# Patient Record
Sex: MALE | Race: WHITE
[De-identification: names, ages, dates, MRNs, and addresses within clinical notes are randomized per-mention and may not be internally consistent; named-entity substitution may affect disease eponyms.]

---

## 2019-03-06 ENCOUNTER — HOSPITAL ENCOUNTER (OUTPATIENT)
Dept: HOSPITAL 47 - RADXRMAIN | Age: 34
End: 2019-03-06
Payer: COMMERCIAL

## 2019-03-06 DIAGNOSIS — M23.41: Primary | ICD-10-CM

## 2019-03-06 DIAGNOSIS — M25.861: ICD-10-CM

## 2019-03-06 NOTE — XR
EXAMINATION TYPE: XR knee complete RT

 

DATE OF EXAM: 3/6/2019

 

CLINICAL HISTORY: ACL repair 2008 with chronic pain.

 

TECHNIQUE:  Three views of the right knee are obtained.

 

COMPARISON: None.

 

FINDINGS:  There is 1.2 x 0.6 cm well-defined osseous fragment at level of tibial condyles consistent
 with intra-articular loose body may be accounting for patient's symptoms. Mild tricompartment joint 
space loss is present. Overlying soft tissue is unremarkable.

 

IMPRESSION: As above.

## 2021-05-04 ENCOUNTER — HOSPITAL ENCOUNTER (EMERGENCY)
Dept: HOSPITAL 47 - EC | Age: 36
Discharge: HOME | End: 2021-05-04
Payer: COMMERCIAL

## 2021-05-04 VITALS
DIASTOLIC BLOOD PRESSURE: 87 MMHG | HEART RATE: 88 BPM | SYSTOLIC BLOOD PRESSURE: 143 MMHG | TEMPERATURE: 98.3 F | RESPIRATION RATE: 20 BRPM
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
## 2021-05-04 DIAGNOSIS — S51.812A: Primary | ICD-10-CM

## 2021-05-04 PROCEDURE — 96372 THER/PROPH/DIAG INJ SC/IM: CPT

## 2021-05-04 PROCEDURE — 99282 EMERGENCY DEPT VISIT SF MDM: CPT

## 2021-05-04 PROCEDURE — 90715 TDAP VACCINE 7 YRS/> IM: CPT

## 2021-05-04 PROCEDURE — 12002 RPR S/N/AX/GEN/TRNK2.6-7.5CM: CPT

## 2021-05-04 PROCEDURE — 90471 IMMUNIZATION ADMIN: CPT

## 2021-05-04 NOTE — ED
General Adult HPI





- General


Chief complaint: Wound/Laceration


Stated complaint: Lt Arm Laceration


Time Seen by Provider: 05/04/21 15:01


Source: patient, RN notes reviewed


Mode of arrival: ambulatory


Limitations: no limitations





- History of Present Illness


Initial comments: 





35-year-old male presents to the emergency room for laceration.  Patient reports

he was using a  to cut open a box.  He slipped and cut his arm.  

Denies any weakness or difficulty moving the fingers or the hand.  Denies any 

other injuries.  Tetanus is not up-to-date.Patient has no other complaints at 

this time including shortness of breath, chest pain, abdominal pain, nausea or 

vomiting, headache, or visual changes.





- Related Data


                                    Allergies











Allergy/AdvReac Type Severity Reaction Status Date / Time


 


terbinafine [From Lamisil] Allergy  Rash/Hives Verified 05/04/21 14:49














Review of Systems


ROS Statement: 


Those systems with pertinent positive or pertinent negative responses have been 

documented in the HPI.





ROS Other: All systems not noted in ROS Statement are negative.





Past Medical History


Past Medical History: No Reported History


History of Any Multi-Drug Resistant Organisms: None Reported


Past Surgical History: Orthopedic Surgery


Additional Past Surgical History / Comment(s): rt knee,rt leg


Past Psychological History: No Psychological Hx Reported


Smoking Status: Never smoker


Past Alcohol Use History: Occasional


Past Drug Use History: None Reported





General Exam


Limitations: no limitations


General appearance: alert, in no apparent distress


Head exam: Present: atraumatic, normocephalic, normal inspection


Eye exam: Present: normal appearance, PERRL, EOMI.  Absent: scleral icterus, 

conjunctival injection, periorbital swelling


ENT exam: Present: normal exam, mucous membranes moist


Neck exam: Present: normal inspection.  Absent: tenderness, meningismus, lymphad

enopathy


Respiratory exam: Present: normal lung sounds bilaterally.  Absent: respiratory 

distress, wheezes, rales, rhonchi, stridor


Cardiovascular Exam: Present: regular rate, normal rhythm, normal heart sounds. 

Absent: systolic murmur, diastolic murmur, rubs, gallop, clicks


GI/Abdominal exam: Absent: distended


Extremities exam: Present: full ROM (Full range motion noted of the left arm, 

hand, wrist, and digits), normal capillary refill (Capillary refill is less than

2 seconds, radial pulse 2+ left upper extremity), other (Patient has a 3 cm 

laceration noted to the volar aspect of the left forearm.  No evidence of 

foreign body.  No deep structure injury.)





Course


                                   Vital Signs











  05/04/21





  14:45


 


Temperature 98.3 F


 


Pulse Rate 88


 


Respiratory 20





Rate 


 


Blood Pressure 143/87


 


O2 Sat by Pulse 97





Oximetry 














Procedures





- Laceration


  ** Laceration #1


Consent Obtained: verbal consent


Indication: laceration


Site: upper extremity


Size (cm): 3


Description: linear


Depth: simple, single layer


Anesthetic Used: lidocaine 1%, with epi


Anesthesia Technique: local infiltration


Amount (mls): 4


Pre-repair: wound explored, irrigated extensively


Type of Sutures: nylon


Size of Sutures: 4-0


Number of Sutures: 6


Technique: simple, interrupted


Patient Tolerated Procedure: well, no complications





Medical Decision Making





- Medical Decision Making





Laceration was irrigated and repaired using simple interrupted sutures.  Tetanus

updated.  Antibiotic ointment applied.  Patient will follow up with his doctor. 

He will return for any worsening symptoms.





Disposition


Clinical Impression: 


 Laceration





Disposition: HOME SELF-CARE


Condition: Good


Instructions (If sedation given, give patient instructions):  Care For Your 

Stitches (ED), Laceration (ED)


Additional Instructions: 


Please keep the area clean. Apply antibiotic ointment twice per day. return to 

the ER for suture removal in 7-10 days. Return for any other worsening symptoms 

or signs of infection.


Is patient prescribed a controlled substance at d/c from ED?: No


Referrals: 


Anton Ramos MD [Primary Care Provider] - 1-2 days


Time of Disposition: 16:21